# Patient Record
Sex: MALE | Race: WHITE | Employment: FULL TIME | URBAN - METROPOLITAN AREA
[De-identification: names, ages, dates, MRNs, and addresses within clinical notes are randomized per-mention and may not be internally consistent; named-entity substitution may affect disease eponyms.]

---

## 2018-04-23 PROBLEM — I10 ESSENTIAL HYPERTENSION: Chronic | Status: ACTIVE | Noted: 2018-04-23

## 2018-04-23 PROBLEM — E66.813 CLASS 3 SEVERE OBESITY DUE TO EXCESS CALORIES WITHOUT SERIOUS COMORBIDITY WITH BODY MASS INDEX (BMI) OF 40.0 TO 44.9 IN ADULT (HCC): Status: ACTIVE | Noted: 2018-04-23

## 2018-04-23 PROBLEM — Z83.3 FAMILY HISTORY OF DIABETES MELLITUS: Chronic | Status: ACTIVE | Noted: 2018-04-23

## 2018-04-23 PROBLEM — E66.01 CLASS 3 SEVERE OBESITY DUE TO EXCESS CALORIES WITHOUT SERIOUS COMORBIDITY WITH BODY MASS INDEX (BMI) OF 40.0 TO 44.9 IN ADULT (HCC): Status: ACTIVE | Noted: 2018-04-23

## 2018-04-23 PROBLEM — Z82.49 FAMILY HISTORY OF ISCHEMIC HEART DISEASE: Chronic | Status: ACTIVE | Noted: 2018-04-23

## 2018-10-16 PROCEDURE — 81003 URINALYSIS AUTO W/O SCOPE: CPT | Performed by: FAMILY MEDICINE

## 2020-02-14 PROBLEM — G47.33 OBSTRUCTIVE SLEEP APNEA SYNDROME: Chronic | Status: ACTIVE | Noted: 2020-02-14

## 2020-08-18 PROBLEM — E66.813 CLASS 3 SEVERE OBESITY DUE TO EXCESS CALORIES WITH SERIOUS COMORBIDITY AND BODY MASS INDEX (BMI) OF 50.0 TO 59.9 IN ADULT (HCC): Status: ACTIVE | Noted: 2018-04-23

## 2020-08-18 PROBLEM — E66.01 CLASS 3 SEVERE OBESITY DUE TO EXCESS CALORIES WITH SERIOUS COMORBIDITY AND BODY MASS INDEX (BMI) OF 50.0 TO 59.9 IN ADULT (HCC): Status: ACTIVE | Noted: 2018-04-23

## 2020-10-14 PROBLEM — M51.27 LUMBAGO-SCIATICA DUE TO DISPLACEMENT OF LUMBAR INTERVERTEBRAL DISC: Status: ACTIVE | Noted: 2020-06-10

## 2020-10-14 PROBLEM — R60.9 DEPENDENT EDEMA: Status: ACTIVE | Noted: 2017-04-09

## 2021-02-17 PROBLEM — N46.01 AZOOSPERMIA: Chronic | Status: ACTIVE | Noted: 2021-02-17

## 2023-09-26 ENCOUNTER — OFFICE VISIT (OUTPATIENT)
Dept: INTERNAL MEDICINE CLINIC | Facility: CLINIC | Age: 48
End: 2023-09-26
Payer: COMMERCIAL

## 2023-09-26 VITALS
DIASTOLIC BLOOD PRESSURE: 84 MMHG | BODY MASS INDEX: 45.1 KG/M2 | TEMPERATURE: 98 F | WEIGHT: 315 LBS | SYSTOLIC BLOOD PRESSURE: 136 MMHG | HEIGHT: 70 IN | HEART RATE: 71 BPM | OXYGEN SATURATION: 97 %

## 2023-09-26 DIAGNOSIS — Z00.00 HEALTH MAINTENANCE EXAMINATION: ICD-10-CM

## 2023-09-26 DIAGNOSIS — G89.29 CHRONIC RIGHT SHOULDER PAIN: ICD-10-CM

## 2023-09-26 DIAGNOSIS — I10 ESSENTIAL HYPERTENSION: Chronic | ICD-10-CM

## 2023-09-26 DIAGNOSIS — M25.511 CHRONIC RIGHT SHOULDER PAIN: ICD-10-CM

## 2023-09-26 DIAGNOSIS — M51.26 LUMBAR HERNIATED DISC: ICD-10-CM

## 2023-09-26 DIAGNOSIS — G47.33 OBSTRUCTIVE SLEEP APNEA SYNDROME: Primary | Chronic | ICD-10-CM

## 2023-09-26 DIAGNOSIS — R73.03 PREDIABETES: ICD-10-CM

## 2023-09-26 DIAGNOSIS — E66.01 CLASS 3 SEVERE OBESITY DUE TO EXCESS CALORIES WITH SERIOUS COMORBIDITY AND BODY MASS INDEX (BMI) OF 50.0 TO 59.9 IN ADULT (HCC): ICD-10-CM

## 2023-09-26 PROBLEM — Z82.49 FAMILY HISTORY OF ISCHEMIC HEART DISEASE: Chronic | Status: RESOLVED | Noted: 2018-04-23 | Resolved: 2023-09-26

## 2023-09-26 PROBLEM — N46.01 AZOOSPERMIA: Chronic | Status: RESOLVED | Noted: 2021-02-17 | Resolved: 2023-09-26

## 2023-09-26 PROBLEM — Z83.3 FAMILY HISTORY OF DIABETES MELLITUS: Chronic | Status: RESOLVED | Noted: 2018-04-23 | Resolved: 2023-09-26

## 2023-09-26 PROBLEM — R60.9 DEPENDENT EDEMA: Status: RESOLVED | Noted: 2017-04-09 | Resolved: 2023-09-26

## 2023-09-26 PROCEDURE — 3008F BODY MASS INDEX DOCD: CPT | Performed by: FAMILY MEDICINE

## 2023-09-26 PROCEDURE — 3075F SYST BP GE 130 - 139MM HG: CPT | Performed by: FAMILY MEDICINE

## 2023-09-26 PROCEDURE — 99204 OFFICE O/P NEW MOD 45 MIN: CPT | Performed by: FAMILY MEDICINE

## 2023-09-26 PROCEDURE — 3079F DIAST BP 80-89 MM HG: CPT | Performed by: FAMILY MEDICINE

## 2023-09-26 NOTE — ASSESSMENT & PLAN NOTE
Patient with a history of a lumbar herniated disc. He notes that he has had microdiscectomy and fusion surgery in the past  Pain is controlled at this time. Advised regularly doing stretches and exercises to prevent further flares in the future.

## 2023-09-26 NOTE — PATIENT INSTRUCTIONS
PATIENT INSTRUCTIONS    Thank you for seeing me today, it was a pleasure taking care of you. Please check out at the  and schedule a follow up appointment. Return in about 2 weeks (around 10/10/2023) for follow up - 40 minutes.   I will review all your test results with your during that office visit   Please continue all current medications for now  See specialist     Jose Juan,  Dr. Bhavesh Toscano

## 2023-09-26 NOTE — ASSESSMENT & PLAN NOTE
Patient notes that he has a history of chronic right shoulder pain  He believes that he has had a right rotator cuff tear in the past.  He has had an EMG completed that showing cervical radiculopathy as well. Symptoms seems consistent with shoulder impingement at this time  He is planning to see physiatry tomorrow. Consider physical therapy. He is often using his shoulder for work-advise resting if possible.

## 2023-09-26 NOTE — ASSESSMENT & PLAN NOTE
Patient with a history of prediabetes  He is currently on metformin 500 mg daily. Check hemoglobin A1c.

## 2023-09-26 NOTE — ASSESSMENT & PLAN NOTE
Check labs. We will discuss medication therapy moving forward. Plan to discuss healthy diet and exercise moving forward as well.

## 2023-09-27 ENCOUNTER — TELEPHONE (OUTPATIENT)
Facility: CLINIC | Age: 48
End: 2023-09-27

## 2023-09-27 ENCOUNTER — TELEPHONE (OUTPATIENT)
Dept: PHYSICAL MEDICINE AND REHAB | Facility: CLINIC | Age: 48
End: 2023-09-27

## 2023-09-27 ENCOUNTER — OFFICE VISIT (OUTPATIENT)
Dept: PHYSICAL MEDICINE AND REHAB | Facility: CLINIC | Age: 48
End: 2023-09-27
Payer: COMMERCIAL

## 2023-09-27 ENCOUNTER — OFFICE VISIT (OUTPATIENT)
Facility: CLINIC | Age: 48
End: 2023-09-27
Payer: COMMERCIAL

## 2023-09-27 VITALS — OXYGEN SATURATION: 94 % | BODY MASS INDEX: 56 KG/M2 | HEART RATE: 72 BPM | WEIGHT: 315 LBS

## 2023-09-27 VITALS
SYSTOLIC BLOOD PRESSURE: 122 MMHG | RESPIRATION RATE: 16 BRPM | HEIGHT: 70 IN | OXYGEN SATURATION: 96 % | BODY MASS INDEX: 45.1 KG/M2 | HEART RATE: 75 BPM | WEIGHT: 315 LBS | DIASTOLIC BLOOD PRESSURE: 72 MMHG

## 2023-09-27 DIAGNOSIS — G56.03 BILATERAL CARPAL TUNNEL SYNDROME: ICD-10-CM

## 2023-09-27 DIAGNOSIS — M75.41 SHOULDER IMPINGEMENT SYNDROME, RIGHT: Primary | ICD-10-CM

## 2023-09-27 DIAGNOSIS — E66.01 CLASS 3 SEVERE OBESITY DUE TO EXCESS CALORIES WITH SERIOUS COMORBIDITY AND BODY MASS INDEX (BMI) OF 50.0 TO 59.9 IN ADULT (HCC): ICD-10-CM

## 2023-09-27 DIAGNOSIS — G47.33 OSA (OBSTRUCTIVE SLEEP APNEA): Primary | ICD-10-CM

## 2023-09-27 PROCEDURE — 99204 OFFICE O/P NEW MOD 45 MIN: CPT | Performed by: PHYSICAL MEDICINE & REHABILITATION

## 2023-09-27 PROCEDURE — 20610 DRAIN/INJ JOINT/BURSA W/O US: CPT | Performed by: PHYSICAL MEDICINE & REHABILITATION

## 2023-09-27 PROCEDURE — 99204 OFFICE O/P NEW MOD 45 MIN: CPT | Performed by: INTERNAL MEDICINE

## 2023-09-27 RX ORDER — TRIAMCINOLONE ACETONIDE 40 MG/ML
80 INJECTION, SUSPENSION INTRA-ARTICULAR; INTRAMUSCULAR ONCE
Status: COMPLETED | OUTPATIENT
Start: 2023-09-27 | End: 2023-09-27

## 2023-09-27 RX ORDER — LIDOCAINE HYDROCHLORIDE 10 MG/ML
4 INJECTION, SOLUTION INFILTRATION; PERINEURAL ONCE
Status: COMPLETED | OUTPATIENT
Start: 2023-09-27 | End: 2023-09-27

## 2023-09-27 NOTE — PROGRESS NOTES
l  2023 - 2023  Patient ID: [id number]  : 1975  Age: 53 years  [address]  [place] [address]  Phone: [phone]  Email: [email or web address]  Compliance Report  Compliance  Payor Standard  Usage 2023 - 2023  Usage days 86/90 days (96%)  >= 4 hours 86 days (96%)  < 4 hours 0 days (0%)  Usage hours 623 hours 56 minutes  Average usage (total days) 6 hours 56 minutes  Average usage (days used) 7 hours 15 minutes  Median usage (days used) 7 hours 7 minutes  AirSense 10 AutoSet  Serial number [id number]  Mode AutoSet  Min Pressure 10 cmH2O  Max Pressure 20 cmH2O  EPR Fulltime  EPR level 2  Response Soft  Therapy  Pressure - cmH2O Median: 12.5 95th percentile: 16.1 Maximum: 17.8  Leaks - L/min Median: 1.5 95th percentile: 26.7 Maximum: 62.2  Events per hour AI: 0.2 HI: 0.7 AHI: 0.9  Apnea Index Central: 0.0 Obstructive: 0.1 Unknown: 0.0  RERA Index 1.7  Cheyne-York respiration (average duration per night) 0 minutes (0%)

## 2023-09-27 NOTE — TELEPHONE ENCOUNTER
Initiated authorization for Right shoulder injection CPT/[facility] 28971,  with Availity  Status: Approved-authorization is not required per health plan        Inj done in office

## 2023-09-28 LAB
ABSOLUTE BASOPHILS: 98 CELLS/UL (ref 0–200)
ABSOLUTE EOSINOPHILS: 123 CELLS/UL (ref 15–500)
ABSOLUTE LYMPHOCYTES: 2107 CELLS/UL (ref 850–3900)
ABSOLUTE MONOCYTES: 648 CELLS/UL (ref 200–950)
ABSOLUTE NEUTROPHILS: 5223 CELLS/UL (ref 1500–7800)
ALBUMIN/GLOBULIN RATIO: 1.7 (CALC) (ref 1–2.5)
ALBUMIN: 4.4 G/DL (ref 3.6–5.1)
ALKALINE PHOSPHATASE: 83 U/L (ref 36–130)
ALT: 26 U/L (ref 9–46)
AST: 17 U/L (ref 10–40)
BASOPHILS: 1.2 %
BILIRUBIN, TOTAL: 0.5 MG/DL (ref 0.2–1.2)
BUN: 14 MG/DL (ref 7–25)
CALCIUM: 9.7 MG/DL (ref 8.6–10.3)
CARBON DIOXIDE: 26 MMOL/L (ref 20–32)
CHLORIDE: 105 MMOL/L (ref 98–110)
CHOL/HDLC RATIO: 2.9 (CALC)
CHOLESTEROL, TOTAL: 122 MG/DL
CREATININE: 0.83 MG/DL (ref 0.6–1.29)
EGFR: 109 ML/MIN/1.73M2
EOSINOPHILS: 1.5 %
GLOBULIN: 2.6 G/DL (CALC) (ref 1.9–3.7)
GLUCOSE: 103 MG/DL (ref 65–99)
HDL CHOLESTEROL: 42 MG/DL
HEMATOCRIT: 47.3 % (ref 38.5–50)
HEMOGLOBIN A1C: 5.7 % OF TOTAL HGB
HEMOGLOBIN: 15.7 G/DL (ref 13.2–17.1)
LDL-CHOLESTEROL: 61 MG/DL (CALC)
LYMPHOCYTES: 25.7 %
MCH: 30.5 PG (ref 27–33)
MCHC: 33.2 G/DL (ref 32–36)
MCV: 91.8 FL (ref 80–100)
MONOCYTES: 7.9 %
MPV: 11 FL (ref 7.5–12.5)
NEUTROPHILS: 63.7 %
NON-HDL CHOLESTEROL: 80 MG/DL (CALC)
PLATELET COUNT: 252 THOUSAND/UL (ref 140–400)
POTASSIUM: 4.5 MMOL/L (ref 3.5–5.3)
PROTEIN, TOTAL: 7 G/DL (ref 6.1–8.1)
RDW: 12.6 % (ref 11–15)
RED BLOOD CELL COUNT: 5.15 MILLION/UL (ref 4.2–5.8)
SODIUM: 140 MMOL/L (ref 135–146)
TRIGLYCERIDES: 105 MG/DL
TSH W/REFLEX TO FT4: 1.32 MIU/L (ref 0.4–4.5)
WHITE BLOOD CELL COUNT: 8.2 THOUSAND/UL (ref 3.8–10.8)

## 2023-10-02 ENCOUNTER — TELEPHONE (OUTPATIENT)
Facility: CLINIC | Age: 48
End: 2023-10-02

## 2023-10-10 NOTE — TELEPHONE ENCOUNTER
[facility]. Sent for scanning and secure emailed to RT for forwarding to [employer]/[facility] (DME)

## 2023-10-11 ENCOUNTER — TELEPHONE (OUTPATIENT)
Facility: CLINIC | Age: 48
End: 2023-10-11

## 2023-10-11 DIAGNOSIS — G47.33 OSA (OBSTRUCTIVE SLEEP APNEA): Primary | ICD-10-CM

## 2023-10-11 NOTE — TELEPHONE ENCOUNTER
Pt talked to HME, pt can't offered machine right now he will like to know if he can use his old machine? Can we order him supplies? If so order is pending for you to sign.

## 2023-10-12 ENCOUNTER — OFFICE VISIT (OUTPATIENT)
Dept: INTERNAL MEDICINE CLINIC | Facility: CLINIC | Age: 48
End: 2023-10-12
Payer: COMMERCIAL

## 2023-10-12 VITALS
SYSTOLIC BLOOD PRESSURE: 118 MMHG | DIASTOLIC BLOOD PRESSURE: 80 MMHG | BODY MASS INDEX: 45.1 KG/M2 | OXYGEN SATURATION: 95 % | HEIGHT: 70 IN | WEIGHT: 315 LBS | TEMPERATURE: 98 F | HEART RATE: 90 BPM

## 2023-10-12 DIAGNOSIS — Z00.00 HEALTH MAINTENANCE EXAMINATION: Primary | ICD-10-CM

## 2023-10-12 DIAGNOSIS — R73.03 PREDIABETES: ICD-10-CM

## 2023-10-12 DIAGNOSIS — M75.41 SHOULDER IMPINGEMENT SYNDROME, RIGHT: ICD-10-CM

## 2023-10-12 DIAGNOSIS — G47.33 OSA (OBSTRUCTIVE SLEEP APNEA): ICD-10-CM

## 2023-10-12 DIAGNOSIS — E66.01 CLASS 3 SEVERE OBESITY DUE TO EXCESS CALORIES WITH SERIOUS COMORBIDITY AND BODY MASS INDEX (BMI) OF 50.0 TO 59.9 IN ADULT (HCC): ICD-10-CM

## 2023-10-12 DIAGNOSIS — M25.511 CHRONIC RIGHT SHOULDER PAIN: ICD-10-CM

## 2023-10-12 DIAGNOSIS — M51.26 LUMBAR HERNIATED DISC: ICD-10-CM

## 2023-10-12 DIAGNOSIS — G89.29 CHRONIC RIGHT SHOULDER PAIN: ICD-10-CM

## 2023-10-12 DIAGNOSIS — I10 ESSENTIAL HYPERTENSION: Chronic | ICD-10-CM

## 2023-10-12 PROCEDURE — 90471 IMMUNIZATION ADMIN: CPT | Performed by: FAMILY MEDICINE

## 2023-10-12 PROCEDURE — 99396 PREV VISIT EST AGE 40-64: CPT | Performed by: FAMILY MEDICINE

## 2023-10-12 PROCEDURE — 3079F DIAST BP 80-89 MM HG: CPT | Performed by: FAMILY MEDICINE

## 2023-10-12 PROCEDURE — 90686 IIV4 VACC NO PRSV 0.5 ML IM: CPT | Performed by: FAMILY MEDICINE

## 2023-10-12 PROCEDURE — 3074F SYST BP LT 130 MM HG: CPT | Performed by: FAMILY MEDICINE

## 2023-10-12 PROCEDURE — 3008F BODY MASS INDEX DOCD: CPT | Performed by: FAMILY MEDICINE

## 2023-10-12 NOTE — PATIENT INSTRUCTIONS
PATIENT INSTRUCTIONS    Thank you for seeing me today, it was a pleasure taking care of you. Please check out at the  and schedule a follow up appointment. Return in about 1 year (around 10/12/2024) for physical .  The following imaging studies were ordered: None  Please also follow up with the following specialists: PM&R, Pulmonology, Weight  Please fill out the advance directive information (power of  documents) and bring a copy to our clinic.   Continue to focus on diet and exercise  Discuss with weight loss specialist regarding medications       Best,   Dr. Matthieu Castañeda

## 2023-10-12 NOTE — ASSESSMENT & PLAN NOTE
The diet and exercise advised  Can consider medication such as Wegovy  I will refer her to a weight specialist to further discuss this.

## 2023-10-12 NOTE — ASSESSMENT & PLAN NOTE
Patient notes that he has a history of chronic right shoulder pain  He believes that he has had a right rotator cuff tear in the past.  He has had an EMG completed that showing cervical radiculopathy as well. Symptoms seems consistent with shoulder impingement at this time  Saw physiatry and received a shoulder injection  He is feeling better at this time. He is often using his shoulder for work-advise resting if possible. Consider physical therapy.

## 2023-10-12 NOTE — ASSESSMENT & PLAN NOTE
Exercise and diet advised. Reviewed with patient in office. Flu shot today. Advanced directive information provided. Advised COVID vaccine booster.

## 2023-10-18 ENCOUNTER — MED REC SCAN ONLY (OUTPATIENT)
Facility: CLINIC | Age: 48
End: 2023-10-18

## 2023-11-19 ENCOUNTER — PATIENT MESSAGE (OUTPATIENT)
Facility: CLINIC | Age: 48
End: 2023-11-19

## 2023-11-21 NOTE — TELEPHONE ENCOUNTER
From: Rona Browne  Sent: 11/19/2023 1:24 AM CST  To: [facility] Pulmonary Sleep Staff  Subject: Cpap settings     From [email or web address]

## 2023-11-21 NOTE — TELEPHONE ENCOUNTER
Settings sent to device    Request [id number]    Set Mode to AutoSet  Set Min Pressure to 10 cmH2O  Set Max Pressure to 20 cmH2O  Set Response to Standard  Set EPR to Fulltime  Set EPR level to 2  Set Ramp enable to Off  Set Climate Control to Auto  Set Humidifier level to Auto  Set Tube temperature to Auto

## 2023-12-12 ENCOUNTER — PATIENT MESSAGE (OUTPATIENT)
Facility: CLINIC | Age: 48
End: 2023-12-12

## 2023-12-13 ENCOUNTER — MED REC SCAN ONLY (OUTPATIENT)
Facility: CLINIC | Age: 48
End: 2023-12-13

## 2024-01-08 ENCOUNTER — PATIENT MESSAGE (OUTPATIENT)
Dept: PHYSICAL MEDICINE AND REHAB | Facility: CLINIC | Age: 49
End: 2024-01-08

## 2024-01-09 RX ORDER — MELOXICAM 15 MG/1
15 TABLET ORAL DAILY
Qty: 30 TABLET | Refills: 0 | Status: SHIPPED | OUTPATIENT
Start: 2024-01-09

## 2024-01-09 NOTE — TELEPHONE ENCOUNTER
From: Dany Canela  To: IMANI MCNEAL  Sent: 1/8/2024 5:03 PM CST  Subject: Pain in shoulder and neck right side     Hi Dr. Mcneal    I’m starting to get pain in my right shoulder/ neck again. Same type of pain. I did make an appointment with you in the middle of this month, but not sure if I could get scheduled sooner. I was lifting some suitcases and the pain started yesterday. Me and family are on vacation and with be back Wednesday night. I’m trying Advil and Tylenol. It’s not working so I’m not sure if you can recommend something else or if I can come in and see you sooner. Thank you Doc I appreciate it.

## 2024-01-10 NOTE — TELEPHONE ENCOUNTER
I will send him some Mobic.  That should help until he can get in for reevaluation.  Please let him know, thanks

## 2024-01-11 ENCOUNTER — E-VISIT (OUTPATIENT)
Dept: TELEHEALTH | Age: 49
End: 2024-01-11
Payer: COMMERCIAL

## 2024-01-11 DIAGNOSIS — U07.1 POSITIVE SELF-ADMINISTERED ANTIGEN TEST FOR COVID-19: Primary | ICD-10-CM

## 2024-01-11 PROCEDURE — 99421 OL DIG E/M SVC 5-10 MIN: CPT | Performed by: NURSE PRACTITIONER

## 2024-01-12 RX ORDER — NIRMATRELVIR AND RITONAVIR 300-100 MG
KIT ORAL
Qty: 1 EACH | Refills: 0 | Status: SHIPPED | OUTPATIENT
Start: 2024-01-12 | End: 2024-01-12 | Stop reason: CLARIF

## 2024-01-12 NOTE — PROGRESS NOTES
Dany Canela is a 48 year old male submitting e-visit for COVID.  HPI:   See answers to questionnaire and Jack Robiehart message   eGFR 109.  Liver enzymes WNL  Pt with hx of HTN and obesity    Current Outpatient Medications   Medication Sig Dispense Refill    lisinopril 20 MG Oral Tab Take 1 tablet (20 mg total) by mouth daily. 90 tablet 2    Meloxicam 15 MG Oral Tab Take 1 tablet (15 mg total) by mouth daily. 30 tablet 0    metFORMIN 500 MG Oral Tab Take 1 tablet (500 mg total) by mouth every evening.        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    Essential hypertension     Lumbar herniated disc 06/10/2020    Obesity     ALANNA (obstructive sleep apnea) 2/3/2020 Split Night      REM AHI 87 Supine  non-supine  [place] 70%     Sleep apnea       Past Surgical History:   Procedure Laterality Date    BACK SURGERY  2021    L5-S1 fusion    BACK SURGERY  2020    Microdiscectomy L5-S1    COLONOSCOPY      OTHER SURGICAL HISTORY Right     4th finger ORIF    OTHER SURGICAL HISTORY      urethral dilitation      Family History   Problem Relation Age of Onset    Diabetes Mother     Heart Disorder Mother     Hypertension Mother     Kidney Disease Mother     Blindness Mother     Diabetes Father     Cancer Father         Lung    Heart Disorder Father     Arrhythmia Father         A-fib    Dementia Maternal Grandmother     Colon Cancer Maternal Grandfather         80s    No Known Problems Paternal Grandmother     Heart Attack Paternal Grandfather     No Known Problems Half-Sister     Prostate Cancer Neg       Social History:  Social History     Socioeconomic History    Marital status:     Number of children: 0   Occupational History    Occupation: [employer]     Comment: week spray and dust exposure   Tobacco Use    Smoking status: Never     Passive exposure: Past (mother and father smoked in home)    Smokeless tobacco: Never   Vaping Use    Vaping Use: Never used    Substance and Sexual Activity    Alcohol use: Yes     Comment: Once a month    Drug use: No    Sexual activity: Yes     Partners: Female   Social History Narrative    Relationships:  Rissa    Children: Huma Phillips (17F), Jaden (15M)    Pets: Dog    School: N/A    Work: Works at [employer]    Origin: From [facility].     Interests: Used to do martial arts. Work a lot. Travels. Spending time with family - kids sports.     Spiritual: Buddhism.              ASSESSMENT AND PLAN:       Diagnoses and all orders for this visit:    Positive self-administered antigen test for COVID-19  Script for paxlovid sent in error; contacted pharmacy to cancel.   Pt eligible for Paxlovid d/t hx of HTN, obesity  Additional evisit questions sent  Pt replied he rec'd script from his doctor.   Supportive measures as described in Patient Instructions.   Patient advised to follow up with PCP if no improvement or worsening of symptoms   To go to the ER for any severe symptoms such as chest pain or difficulty breathing.   Provided  CDC recommendations for self isolation    Refer to [employer] exchange for specific patient instructions      Duration of  the service:  10 minutes

## 2024-03-20 RX ORDER — LISINOPRIL 20 MG/1
20 TABLET ORAL DAILY
Qty: 90 TABLET | Refills: 3 | Status: SHIPPED | OUTPATIENT
Start: 2024-03-20

## 2024-07-17 ENCOUNTER — OFFICE VISIT (OUTPATIENT)
Dept: PHYSICAL MEDICINE AND REHAB | Facility: CLINIC | Age: 49
End: 2024-07-17
Payer: COMMERCIAL

## 2024-07-17 VITALS — OXYGEN SATURATION: 96 % | HEART RATE: 68 BPM | WEIGHT: 315 LBS | BODY MASS INDEX: 56 KG/M2

## 2024-07-17 DIAGNOSIS — M75.41 SHOULDER IMPINGEMENT SYNDROME, RIGHT: Primary | ICD-10-CM

## 2024-07-17 PROCEDURE — 99214 OFFICE O/P EST MOD 30 MIN: CPT | Performed by: PHYSICAL MEDICINE & REHABILITATION

## 2024-07-17 PROCEDURE — 20610 DRAIN/INJ JOINT/BURSA W/O US: CPT | Performed by: PHYSICAL MEDICINE & REHABILITATION

## 2024-07-17 RX ORDER — LIDOCAINE HYDROCHLORIDE 10 MG/ML
4 INJECTION, SOLUTION INFILTRATION; PERINEURAL ONCE
Status: COMPLETED | OUTPATIENT
Start: 2024-07-17 | End: 2024-07-17

## 2024-07-17 RX ORDER — TIRZEPATIDE 2.5 MG/.5ML
2.5 INJECTION, SOLUTION SUBCUTANEOUS
COMMUNITY
Start: 2024-07-17

## 2024-07-17 RX ORDER — TRIAMCINOLONE ACETONIDE 40 MG/ML
80 INJECTION, SUSPENSION INTRA-ARTICULAR; INTRAMUSCULAR ONCE
Status: COMPLETED | OUTPATIENT
Start: 2024-07-17 | End: 2024-07-17

## 2024-07-17 NOTE — PROGRESS NOTES
[facility]  Progress Note    CHIEF COMPLAINT:    Chief Complaint   Patient presents with    Follow - Up     09/27/23 R shoulder csi and LOV. States 100% relief after injection. Pain returned about 2 weeks ago. Ibuprofen daily. Pain 7/10. He is having R side neck pain. Denies t/n.        History of Present Illness:  Dany Canela is a 48 year old male who presents today for follow up for symptoms of chronic intermittent right shoulder pain.  I last saw him in September and injected his shoulder for rotator cuff impingement.  He had 100% relief until about 2 weeks ago when he had insidious worsening.  The shoulder is painful with overhead activity.  No numbness or tingling.  There is also right-sided trapezius pain.    PAST MEDICAL HISTORY:  Past Medical History:    Azoospermia    Essential hypertension    Lumbar herniated disc    Obesity    ALANNA (obstructive sleep apnea)      REM AHI 87 Supine  non-supine  Sao2 [place] 70%     Sleep apnea       SURGICAL HISTORY:  Past Surgical History:   Procedure Laterality Date    Back surgery  2021    L5-S1 fusion    Back surgery  2020    Microdiscectomy L5-S1    Colonoscopy      Other surgical history Right     4th finger ORIF    Other surgical history      urethral dilitation       SOCIAL HISTORY:   Social History     Occupational History    Occupation: [employer]     Comment: week spray and dust exposure   Tobacco Use    Smoking status: Never     Passive exposure: Past (mother and father smoked in home)    Smokeless tobacco: Never   Vaping Use    Vaping status: Never Used   Substance and Sexual Activity    Alcohol use: Yes     Comment: Once a month    Drug use: No    Sexual activity: Yes     Partners: Female       CURRENT MEDICATIONS:   Current Outpatient Medications   Medication Sig Dispense Refill    Tirzepatide (MOUNJARO) 2.5 MG/0.5ML Subcutaneous Solution Pen-injector Inject 2.5 mg into the  skin every 7 days.      metFORMIN 500 MG Oral Tab Take 1 tablet (500 mg total) by mouth every evening. 90 tablet 3    lisinopril 20 MG Oral Tab Take 1 tablet (20 mg total) by mouth daily. 90 tablet 3       ALLERGIES:   No Known Allergies      PHYSICAL EXAM:   Pulse 68   Wt (!) 390 lb (176.9 kg)   SpO2 96%   BMI 55.96 kg/m²     Body mass index is 55.96 kg/m².      General: No immediate distress  Extremities: No upper extremity edema bilaterally   Spine: full and painfree cervical ROM in all directions  Shoulders: Positive right impingement signs, tender over the paracervical and periscapular musculature on the right I  Neuro:   Cognition: alert & oriented x 3, attentive, comprehention intact, spontaneous speech intact  Strength:  Upper extremities have 5/5 strength  Sensation: Normal upper extremities  Reflexes: Normal upper extremities        Data  An EMG done on 2022 showed fibrillation and motor unit abnormalities in the deltoid and biceps.     Radiology Imagin.  A shoulder x-ray from 2022 was normal  2.  A plain film x-ray of the cervical spine from 2022 showed C4-5 facet arthrosis    ASSESSMENT AND PLAN:  1. Shoulder impingement syndrome, right  He seems to respond to shoulder injections.  Will repeat that today.  - SPECIALTY (OTHER) - EXTERNAL  - lidocaine (Xylocaine) 1 % injection  - triamcinolone acetonide (Kenalog-40) 40 MG/ML injection 80 mg        RTC as needed        The patient was in agreement with the assessment and plan.  All questions were answered.        Titus Mcneal MD  Physical Medicine and Rehabilitation/Sports Medicine  [facility]

## 2024-07-18 NOTE — PROCEDURES
Shoulder injection  Location: right  After discussing benefits and possible side effects, we proceeded with a subacromial bursa injection. The patient was consented.  The patient was seated, and the posterolateral shoulder was palpated. The skin was sterilely prepped.  A 25-gauge needle was introduced into the subacromial space.   A total of 2 cc of 40 mg/ml Kenalog and 4 cc of 1% lidocaine was injected.     The patient tolerated the procedure well without adverse effects.

## 2024-07-19 ENCOUNTER — TELEPHONE (OUTPATIENT)
Dept: PHYSICAL MEDICINE AND REHAB | Facility: CLINIC | Age: 49
End: 2024-07-19

## 2024-07-19 NOTE — TELEPHONE ENCOUNTER
Initiated authorization for Right shoulder injection CPT/[facility] 25980,  with Availity   Status -Approved- authorization not required  Injection done in office.

## 2024-07-28 ENCOUNTER — PATIENT MESSAGE (OUTPATIENT)
Dept: PHYSICAL MEDICINE AND REHAB | Facility: CLINIC | Age: 49
End: 2024-07-28

## 2024-07-29 ENCOUNTER — PATIENT MESSAGE (OUTPATIENT)
Dept: PHYSICAL MEDICINE AND REHAB | Facility: CLINIC | Age: 49
End: 2024-07-29

## 2024-07-29 ENCOUNTER — TELEPHONE (OUTPATIENT)
Dept: PHYSICAL MEDICINE AND REHAB | Facility: CLINIC | Age: 49
End: 2024-07-29

## 2024-07-29 RX ORDER — CYCLOBENZAPRINE HCL 10 MG
10 TABLET ORAL NIGHTLY
Qty: 30 TABLET | Refills: 0 | Status: SHIPPED | OUTPATIENT
Start: 2024-07-29 | End: 2024-08-28

## 2024-07-29 RX ORDER — MELOXICAM 15 MG/1
15 TABLET ORAL DAILY
Qty: 30 TABLET | Refills: 0 | Status: SHIPPED | OUTPATIENT
Start: 2024-07-29

## 2024-07-29 NOTE — TELEPHONE ENCOUNTER
From: Dany Canela  To: IMANI ESTRADA  Sent: 7/28/2024 8:51 AM CDT  Subject: Right side neck pain    Good Morning Doctor Fredis I’m having muscle pain on my right side of my neck. Can I try a medicine for it. I’ve tried to ice and put heat on it with no relief.     Thank you     Dany Canela

## 2024-07-29 NOTE — TELEPHONE ENCOUNTER
From: Dany Canela  To: IMANI ESTRADA  Sent: 7/29/2024 8:31 AM CDT  Subject: Right side neck pain     Good Morning Doctor Fredis I’m having muscle pain on my right side of my neck. Can I try a medicine for it. I’ve tried to ice and put heat on it with no relief.      Thank you      Dany Canela

## 2024-07-29 NOTE — TELEPHONE ENCOUNTER
Left detailed message regarding recommendations.     Patient can callback with any questions or concerns.       Closing encounter at this time.

## 2024-07-29 NOTE — TELEPHONE ENCOUNTER
Refill Request    Medication request: Meloxicam 15 MG Oral Tab. Take 1 tablet (15 mg total) by mouth daily.     LOV:7/17/2024 Titus Mcneal MD   Due back to clinic per last office note: Per Dr. Mcneal: \"RTC as needed.\"  NOV: Visit date not found      [place] data not available    Urine drug screen (if applicable): n/a  Pain contract: n/a    LOV plan (if weaning or changing medications): Not mentioned in LOV note.

## 2024-07-29 NOTE — TELEPHONE ENCOUNTER
Condition update after Procedure.    - Patient had Right Shoulder Injection on 07/17/2024 with Dr. Mcneal.  - 100% relief until 07/26/2024 when patient's pain returned to neck. The RIGHT shoulder is reportedly still feeling OK..    If pain is worse:  - Pain level prior to procedure:   7  - Current pain level:  7    - Pain location: RIGHT neck.  - Pain description: throbbing and dull, \"ripping\"  - Current pain treatment: heat/ice, PT, and OTC medications; ibuprofen 800MG w/o relief; ice/heat w/o relief  - Current prescription pain medications/dosing: Patient requesting Meloxicam & Muscle Relaxer - or whatever Dr. Mcneal can recommend for the pain.  NOTE: refill request for Meloxicam already routed to Dr. Mcneal - see refill encounter 07/28/2024 for further details.    - LOV: 7/17/2024 Titus Mcneal MD    - NOV: Visit date not found   - Plan from LOV:   \"ASSESSMENT AND PLAN:  1. Shoulder impingement syndrome, right  He seems to respond to shoulder injections.  Will repeat that today.  - SPECIALTY (OTHER) - EXTERNAL  - lidocaine (Xylocaine) 1 % injection  - triamcinolone acetonide (Kenalog-40) 40 MG/ML injection 80 mg     RTC as needed.\"

## 2024-08-02 ENCOUNTER — HOSPITAL ENCOUNTER (OUTPATIENT)
Dept: GENERAL RADIOLOGY | Facility: HOSPITAL | Age: 49
Discharge: HOME | End: 2024-08-02
Attending: PHYSICAL MEDICINE & REHABILITATION
Payer: COMMERCIAL

## 2024-08-02 ENCOUNTER — OFFICE VISIT (OUTPATIENT)
Dept: PHYSICAL MEDICINE AND REHAB | Facility: CLINIC | Age: 49
End: 2024-08-02
Payer: COMMERCIAL

## 2024-08-02 VITALS — HEIGHT: 70 IN | BODY MASS INDEX: 45.1 KG/M2 | WEIGHT: 315 LBS

## 2024-08-02 DIAGNOSIS — M75.41 SHOULDER IMPINGEMENT SYNDROME, RIGHT: ICD-10-CM

## 2024-08-02 DIAGNOSIS — G57.12 MERALGIA PARESTHETICA OF LEFT SIDE: ICD-10-CM

## 2024-08-02 DIAGNOSIS — M75.41 SHOULDER IMPINGEMENT SYNDROME, RIGHT: Primary | ICD-10-CM

## 2024-08-02 PROCEDURE — 99214 OFFICE O/P EST MOD 30 MIN: CPT | Performed by: PHYSICAL MEDICINE & REHABILITATION

## 2024-08-02 PROCEDURE — 73030 X-RAY EXAM OF SHOULDER: CPT | Performed by: PHYSICAL MEDICINE & REHABILITATION

## 2024-08-02 RX ORDER — BENAZEPRIL HYDROCHLORIDE 20 MG/1
20 TABLET ORAL DAILY
COMMUNITY
Start: 2023-12-05

## 2024-08-02 NOTE — PROGRESS NOTES
[facility]  Progress Note    CHIEF COMPLAINT:    Chief Complaint   Patient presents with    Follow - Up     LOV 7/17/2024 Patient follows up on right shoulder and neck pain, received CSI at last visit which provided much relief for about 1-2 weeks and pain has returned. C/o now experiencing N/T in right thigh. Taking meloxicam and flexeril with some relief with sleep. LOP 5/10       History of Present Illness:  Dany Canela is a 48 year old male who presents today for follow up for symptoms of right shoulder pain, also left leg numbness status post remote lumbar surgery..  I saw him about 2 weeks ago for right shoulder injection.  This helped but the pain has returned, still achy.  Still able to perform his heavy work.  He has lost some weight recently and noticed lateral thigh numbness and tingling on the left.    PAST MEDICAL HISTORY:  Past Medical History:    Azoospermia    Diabetes (HCC)    Pre-diabetes    Essential hypertension    Lumbar herniated disc    Obesity    ALANNA (obstructive sleep apnea)      REM AHI 87 Supine  non-supine  [place]ao2 [place] 70%     Sleep apnea       SURGICAL HISTORY:  Past Surgical History:   Procedure Laterality Date    Back surgery  2021    L5-S1 fusion    Back surgery  2020    Microdiscectomy L5-S1    Colonoscopy      Other surgical history Right     4th finger ORIF    Other surgical history      urethral dilitation       SOCIAL HISTORY:   Social History     Occupational History    Occupation: [employer]     Comment: week spray and dust exposure   Tobacco Use    Smoking status: Never     Passive exposure: Past (mother and father smoked in home)    Smokeless tobacco: Never   Vaping Use    Vaping status: Never Used   Substance and Sexual Activity    Alcohol use: Not Currently     Comment: Once a month    Drug use: No    Sexual activity: Yes     Partners: Female       CURRENT MEDICATIONS:   Current Outpatient  Medications   Medication Sig Dispense Refill    Benazepril HCl 20 MG Oral Tab Take 1 tablet (20 mg total) by mouth daily.      MELOXICAM 15 MG Oral Tab TAKE 1 TABLET(15 MG) BY MOUTH DAILY 30 tablet 0    cyclobenzaprine 10 MG Oral Tab Take 1 tablet (10 mg total) by mouth nightly. 30 tablet 0    Tirzepatide (MOUNJARO) 2.5 MG/0.5ML Subcutaneous Solution Pen-injector Inject 2.5 mg into the skin every 7 days.      metFORMIN 500 MG Oral Tab Take 1 tablet (500 mg total) by mouth every evening. 90 tablet 3    lisinopril 20 MG Oral Tab Take 1 tablet (20 mg total) by mouth daily. 90 tablet 3       ALLERGIES:   No Known Allergies      PHYSICAL EXAM:   Ht 70\"   Wt (!) 390 lb (176.9 kg)   BMI 55.96 kg/m²     Body mass index is 55.96 kg/m².      General: No immediate distress  Extremities: No upper extremity edema bilaterally   Spine: full and painfree cervical ROM in all directions  Shoulders: Mild right impingement signs, full range of motion  Neuro:   Cognition: alert & oriented x 3, attentive, comprehention intact, spontaneous speech intact  Strength:  Upper extremities have 5/5 strength  Sensation: Normal upper extremities, decreased over the left lateral femoral cutaneous distribution  Reflexes: Normal upper extremities  Spurling's sign: neg        Data    Radiology Imaging:  Last x-ray was in 2022    ASSESSMENT AND PLAN:  1. Shoulder impingement syndrome, right  Given persistent symptoms I recommended do x-ray, and physical therapy, continue Mobic and Flexeril.  Return in 4 weeks.  If no better consider repeat injection and/or MRI  - XR SHOULDER, COMPLETE (MIN 2 VIEWS), RIGHT (CPT=73030); Future  - PHYSICAL THERAPY EXTERNAL    2. Meralgia paresthetica of left side  Explained this is a benign symptom, not related to the lumbar spine.            The patient was in agreement with the assessment and plan.  All questions were answered.        Titus Mcneal MD  Physical Medicine and Rehabilitation/Sports Medicine  [facility] [place]

## (undated) NOTE — LETTER
AUTHORIZATION FOR SURGICAL OPERATION OR OTHER PROCEDURE    1. I hereby authorize Dr. Lilian Weston and the [facility] Office staff assigned to my case to perform the following operation and/or procedure at the [facility] Office:    RIGHT shoulder injection    2. My physician has explained the nature and purpose of the operation or other procedure, possible alternative methods of treatment, the risks involved, and the possibility of complication to me. I acknowledge that no guarantee has been made as to the result that may be obtained. 3.  I recognize that, during the course of this operation, or other procedure, unforseen conditions may necessitate additional or different procedure than those listed above. I, therefore, further authorize and request that the above named physician, his/her physician assistants or designees perform such procedures as are, in his/her professional opinion, necessary and desirable. 4.  Any tissue or organs removed in the operation or other procedure may be disposed of by and at the discretion of the [facility] Office staff and [facility]. 5.  I understand that in the event of a medical emergency, I will be transported by local paramedics to [facility] or other hospital emergency department. 6.  I certify that I have read and fully understand the above consent to operation and/or other procedure. 7.  I acknowledge that my physician has explained sedation/analgesia administration to me including the risks and benefits. I consent to the administration of sedation/analgesia as may be necessary or desirable in the judgement of my physician. Witness signature: ___________________________________________________ Date:  ______/______/_____                    Time:  ________ A. M.  P.M.        Patient Name:  Agustin Reyna  [birth year]  [id number]         Patient signature:  ___________________________________________________               Statement of Physician  My signature below affirms that prior to the time of the procedure, I have explained to the patient and/or his/her guardian, the risks and benefits involved in the proposed treatment and any reasonable alternative to the proposed treatment. I have also explained the risks and benefits involved in the refusal of the proposed treatment and have answered the patient's questions.                         Date:  ______/______/_______  Provider                      Signature:  __________________________________________________________       Time:  ___________ A.M    P.M.

## (undated) NOTE — Clinical Note
Dear Isiah Kapadia,  Thank you for sending Narcisa Dodson to see me for physiatry consultation. I appreciate your confidence in me to care for your patients. Please feel free call me with any questions at [phone] or contact me through [facility]   Sincerely, Shivam Noland MD Board Certified, Physical Medicine and Rehabilitation Specializing in [address] [facility], Spine Medicine and [address] [facility]

## (undated) NOTE — LETTER
Date: 2023      Patient Name: Frances Laws      : 1975        Thank you for choosing [facility] as your health care provider. Your physician has deemed the following medical service(s) necessary. However, your insurance plan may not pay for all of your health care and costs and may deny payment for this service. The fact that your insurance plan does not pay for an item or service does not mean you should not receive it. The purpose of this form is to help you make an informed decision about whether or not you want to receive this service(s) that may not be paid for by your insurance plan. CPT Code Description     Cost     _________ RIGHT shoulder injection      _________ ______________________________ _____________      _________ ______________________________ _____________      I understand that the above mentioned service(s) or supply may not be covered by my insurance company.  I agree to be financially responsible for the cost of this service or supply in the event of my insurance denies payment as a non-covered benefit.        ______________________________________________________________________  Signature of Patient or Patient's Representative  Relationship  Date    ______________________________________________________________________  Signature of Witness to signing of form   Printed Name

## (undated) NOTE — LETTER
Date: 2024      Patient Name: Dany Canela      : 1975        Thank you for choosing [facility] as your health care provider. Your physician has deemed the following medical service(s) necessary. However, your insurance plan may not pay for all of your health care and costs and may deny payment for this service. The fact that your insurance plan does not pay for an item or service does not mean you should not receive it. The purpose of this form is to help you make an informed decision about whether or not you want to receive this service(s) that may not be paid for by your insurance plan.    CPT Code Description     Cost     Right shoulder CSI      I understand that the above mentioned service(s) or supply may not be covered by my insurance company. I agree to be financially responsible for the cost of this service or supply in the event of my insurance denies payment as a non-covered benefit.        ______________________________________________________________________  Signature of Patient or Patient's Representative  Relationship  Date    ______________________________________________________________________  Signature of Witness to signing of form   Printed Name

## (undated) NOTE — LETTER
[facility] [address], [facility]   Date:   9/27/2023     Name:   Fritz Batista    YOB: 1975   MRN:   [id number]       WHERE IS YOUR PAIN NOW? Mayte the areas on your body where you feel the described sensations. Use the appropriate symbol. Stephanie [place] the areas of radiation. Include all affected areas. Just to complete the picture, please draw in the face. ACHE:  ^ ^ ^   NUMBNESS:  0000   PINS & NEEDLES:  = = = =                              ^ ^ ^                       0000              = = = =                                    ^ ^ ^                       0000            = = = =      BURNING:  XXXX   STABBING: ////                  XXXX                ////                         XXXX          ////     Please amyte the line below indicating your degree of pain right now  with 0 being no pain 10 being the worst pain possible.                                          0             1             2              3             4              5              6              7             8             9             10         Patient Signature:

## (undated) NOTE — LETTER
AUTHORIZATION FOR SURGICAL OPERATION OR OTHER PROCEDURE    1. I hereby authorize Dr. Titus Mcneal and the [facility] staff assigned to my case to perform the following operation and/or procedure at the [facility]:    Right shoulder CSI    2.  My physician has explained the nature and purpose of the operation or other procedure, possible alternative methods of treatment, the risks involved, and the possibility of complication to me.  I acknowledge that no guarantee has been made as to the result that may be obtained.  3.  I recognize that, during the course of this operation, or other procedure, unforseen conditions may necessitate additional or different procedure than those listed above.  I, therefore, further authorize and request that the above named physician, his/her physician assistants or designees perform such procedures as are, in his/her professional opinion, necessary and desirable.  4.  Any tissue or organs removed in the operation or other procedure may be disposed of by and at the discretion of the [facility] staff and [facility].  5.  I understand that in the event of a medical emergency, I will be transported by local paramedics to [facility] or other hospital emergency department.  6.  I certify that I have read and fully understand the above consent to operation and/or other procedure.    7.  I acknowledge that my physician has explained sedation/analgesia administration to me including the risks and benefits.  I consent to the administration of sedation/analgesia as may be necessary or desirable in the judgement of my physician.    Witness signature: ___________________________________________________ Date:  ______/______/_____                    Time:  ________ A.M.  P.M.       Patient Name:  Dany Canela   8/18/1975  [id number]         Patient signature:  ___________________________________________________      My signature below affirms that prior to  the time of the procedure, I have explained to the patient and/or his/her guardian, the risks and benefits involved in the proposed treatment and any reasonable alternative to the proposed treatment.  I have also explained the risks and benefits involved in the refusal of the proposed treatment and have answered the patient's questions.                        Date:  ______/______/_______  Provider                      Signature:  __________________________________________________________       Time:  ___________ A.M    P.M.

## (undated) NOTE — LETTER
WHERE IS YOUR PAIN NOW?  Mayte the areas on your body where you feel the described sensations.  Use the appropriate symbol.  Mayte the areas of radiation.  Include all affected areas.  Just to complete the picture, please draw in the face.     ACHE:  ^ ^ ^   NUMBNESS:  0000   PINS & NEEDLES:  = = = =                              ^ ^ ^                       0000              = = = =                                    ^ ^ ^                       0000            = = = =      BURNING:  XXXX   STABBING: ////                  XXXX                ////                         XXXX          ////     Please mayte the line below indicating your degree of pain right now  with 0 being no pain 10 being the worst pain possible.                                         0             1             2              3             4              5              6              7             8             9             10         Patient Signature: